# Patient Record
Sex: MALE | Race: WHITE | NOT HISPANIC OR LATINO
[De-identification: names, ages, dates, MRNs, and addresses within clinical notes are randomized per-mention and may not be internally consistent; named-entity substitution may affect disease eponyms.]

---

## 2019-07-09 ENCOUNTER — APPOINTMENT (OUTPATIENT)
Dept: CARDIOLOGY | Facility: CLINIC | Age: 34
End: 2019-07-09
Payer: COMMERCIAL

## 2019-07-09 PROCEDURE — 93000 ELECTROCARDIOGRAM COMPLETE: CPT

## 2019-07-09 PROCEDURE — 99204 OFFICE O/P NEW MOD 45 MIN: CPT

## 2019-08-12 ENCOUNTER — APPOINTMENT (OUTPATIENT)
Dept: CARDIOLOGY | Facility: CLINIC | Age: 34
End: 2019-08-12
Payer: COMMERCIAL

## 2019-08-12 PROCEDURE — 93306 TTE W/DOPPLER COMPLETE: CPT

## 2019-08-14 ENCOUNTER — APPOINTMENT (OUTPATIENT)
Dept: CARDIOLOGY | Facility: CLINIC | Age: 34
End: 2019-08-14
Payer: COMMERCIAL

## 2019-08-14 PROCEDURE — 99213 OFFICE O/P EST LOW 20 MIN: CPT

## 2020-07-29 ENCOUNTER — RECORD ABSTRACTING (OUTPATIENT)
Age: 35
End: 2020-07-29

## 2020-07-29 DIAGNOSIS — Z78.9 OTHER SPECIFIED HEALTH STATUS: ICD-10-CM

## 2020-08-12 ENCOUNTER — APPOINTMENT (OUTPATIENT)
Dept: CARDIOLOGY | Facility: CLINIC | Age: 35
End: 2020-08-12
Payer: COMMERCIAL

## 2020-08-12 VITALS
WEIGHT: 222 LBS | BODY MASS INDEX: 27.03 KG/M2 | TEMPERATURE: 98 F | SYSTOLIC BLOOD PRESSURE: 122 MMHG | HEART RATE: 93 BPM | HEIGHT: 76 IN | DIASTOLIC BLOOD PRESSURE: 80 MMHG

## 2020-08-12 DIAGNOSIS — Z00.00 ENCOUNTER FOR GENERAL ADULT MEDICAL EXAMINATION W/OUT ABNORMAL FINDINGS: ICD-10-CM

## 2020-08-12 DIAGNOSIS — Q24.9 CONGENITAL MALFORMATION OF HEART, UNSPECIFIED: ICD-10-CM

## 2020-08-12 PROCEDURE — 99213 OFFICE O/P EST LOW 20 MIN: CPT

## 2020-08-12 PROCEDURE — 93000 ELECTROCARDIOGRAM COMPLETE: CPT

## 2020-08-18 ENCOUNTER — APPOINTMENT (OUTPATIENT)
Dept: CARDIOLOGY | Facility: CLINIC | Age: 35
End: 2020-08-18
Payer: COMMERCIAL

## 2020-08-18 PROCEDURE — 93306 TTE W/DOPPLER COMPLETE: CPT

## 2020-10-09 ENCOUNTER — APPOINTMENT (OUTPATIENT)
Dept: GASTROENTEROLOGY | Facility: CLINIC | Age: 35
End: 2020-10-09

## 2021-02-02 ENCOUNTER — NON-APPOINTMENT (OUTPATIENT)
Age: 36
End: 2021-02-02

## 2021-02-16 ENCOUNTER — APPOINTMENT (OUTPATIENT)
Dept: CARDIOLOGY | Facility: CLINIC | Age: 36
End: 2021-02-16

## 2021-09-25 ENCOUNTER — TRANSCRIPTION ENCOUNTER (OUTPATIENT)
Age: 36
End: 2021-09-25

## 2021-10-09 ENCOUNTER — TRANSCRIPTION ENCOUNTER (OUTPATIENT)
Age: 36
End: 2021-10-09

## 2023-01-05 ENCOUNTER — EMERGENCY (EMERGENCY)
Facility: HOSPITAL | Age: 38
LOS: 0 days | Discharge: HOME | End: 2023-01-05
Attending: EMERGENCY MEDICINE | Admitting: EMERGENCY MEDICINE
Payer: COMMERCIAL

## 2023-01-05 VITALS
TEMPERATURE: 97 F | OXYGEN SATURATION: 99 % | SYSTOLIC BLOOD PRESSURE: 130 MMHG | WEIGHT: 220.02 LBS | RESPIRATION RATE: 16 BRPM | DIASTOLIC BLOOD PRESSURE: 64 MMHG | HEART RATE: 78 BPM

## 2023-01-05 DIAGNOSIS — M79.18 MYALGIA, OTHER SITE: ICD-10-CM

## 2023-01-05 DIAGNOSIS — Z20.822 CONTACT WITH AND (SUSPECTED) EXPOSURE TO COVID-19: ICD-10-CM

## 2023-01-05 DIAGNOSIS — R11.0 NAUSEA: ICD-10-CM

## 2023-01-05 DIAGNOSIS — B34.9 VIRAL INFECTION, UNSPECIFIED: ICD-10-CM

## 2023-01-05 DIAGNOSIS — Z88.0 ALLERGY STATUS TO PENICILLIN: ICD-10-CM

## 2023-01-05 DIAGNOSIS — R51.9 HEADACHE, UNSPECIFIED: ICD-10-CM

## 2023-01-05 DIAGNOSIS — R20.2 PARESTHESIA OF SKIN: ICD-10-CM

## 2023-01-05 LAB
FLUAV AG NPH QL: SIGNIFICANT CHANGE UP
FLUBV AG NPH QL: SIGNIFICANT CHANGE UP
RSV RNA NPH QL NAA+NON-PROBE: SIGNIFICANT CHANGE UP
SARS-COV-2 RNA SPEC QL NAA+PROBE: SIGNIFICANT CHANGE UP

## 2023-01-05 PROCEDURE — 99284 EMERGENCY DEPT VISIT MOD MDM: CPT

## 2023-01-05 NOTE — ED PROVIDER NOTE - CARE PLAN
1 Principal Discharge DX:	Viral syndrome  Secondary Diagnosis:	Diarrhea  Secondary Diagnosis:	Paresthesias

## 2023-01-05 NOTE — ED ADULT TRIAGE NOTE - TEMPERATURE IN FAHRENHEIT (DEGREES F)
PATIENT NEEDS REFILL ON PROTONIX AND ESCITALOPRAM SENT TO [facility]    ALSO WANTED TO LET DR TAYLOR KNOW PATIENT IS DOING WELL ON THE NEW MEDICATION, ESCITALOPRAM.   97

## 2023-01-05 NOTE — ED PROVIDER NOTE - PHYSICAL EXAMINATION
CONST: Well appearing in NAD  EYES: PERRL, EOMI, Sclera and conjunctiva clear.   ENT: No nasal discharge. Oropharynx normal appearing, no erythema or exudates. Uvula midline.  CARD: Normal S1 S2; Normal rate and rhythm  RESP: Equal BS B/L, No wheezes, rhonchi or rales. No distress  GI: Soft, non-tender, non-distended.  SKIN: Warm, dry, no acute rashes. Good turgor  NEURO: A&Ox3, No focal deficits. Strength 5/5 with no sensory deficits. Steady gait

## 2023-01-05 NOTE — ED PROVIDER NOTE - OBJECTIVE STATEMENT
38yo male with no significant PMH, PSH significant for atrial septal defect repair, presents with recent illness, symptoms consistent of gradual onset nonexertional headache, nausea, myalgias, today experience paresthesias to arms legs and genitals after taking Excedrin, now resolved. Pt also notes diarrhea for 4 days and son sick with similar symptoms. Pt denies any aggravating or relieving factors. Initially prompted to come to ED for paresthesias after taking Excedrin but they have self resolved. Pt denies extremity weakness, loss of power.

## 2023-01-05 NOTE — ED PROVIDER NOTE - PATIENT PORTAL LINK FT
You can access the FollowMyHealth Patient Portal offered by [facility] by registering at the following website: [email or web address]. By joining [employer]’s FollowMyHealth portal, you will also be able to view your health information using other applications (apps) compatible with our system.

## 2023-01-05 NOTE — ED PROVIDER NOTE - NS ED ROS FT
CONST: No fever, chills or bodyaches  EYES: No pain, redness, drainage or visual changes.  ENT: No ear pain or discharge, nasal discharge or congestion. No sore throat  CARD: No chest pain, palpitations  RESP: No SOB, cough  GI: (+) diarrhea, no abdominal pain  MS: No joint pain, back pain or extremity pain/injury  SKIN: No rashes  NEURO: (+) paresthesias

## 2023-01-05 NOTE — ED ADULT TRIAGE NOTE - CHIEF COMPLAINT QUOTE
pt reports he had the flu last week and has been taking imodium and Excedrin, now he feels a "tingling" sensation to his genitals

## 2023-01-05 NOTE — ED PROVIDER NOTE - ATTENDING APP SHARED VISIT CONTRIBUTION OF CARE
37-year-old male denies significant PMH, PSH significant for atrial septal defect repair, non-smoker, denies daily EtOH use, presents with recent illness, symptoms consistent of gradual onset nonexertional headache, nausea, myalgias, today experience paresthesias to arms legs and genitals, now resolved,    Patient reports sick contacts at home (son). 37-year-old male denies significant PMH, PSH significant for atrial septal defect repair, non-smoker, denies daily EtOH use, presents with recent illness, symptoms consistent of gradual onset nonexertional headache, nausea, myalgias, today experience paresthesias to arms legs and genitals, now resolved, denies ear pain, sore throat, stuffy / runny nose, neck pain, cough, chest / abdominal pain, v/d, anorexia, respiratory sx, change in bowel habits or urinary sx, rash or other associated complaints at present. Patient reports sick contacts at home (son). No old chart available for review. I have reviewed and agree with the initial nursing note, except as documented in my note.    VSS, awake, alert, non-toxic appearing, ears clear, oropharynx clear, mmm, no JVD or bruit, no skin rash or lesions, chest CTAB, non-labored breathing, no w/r/r, +S1/S2, RRR, no m/r/g, abdomen soft, NT, ND, +BS, no peripheral edema or deformities, CN II- XII intact, alert, clear speech and steady gait.

## 2023-01-06 ENCOUNTER — TRANSCRIPTION ENCOUNTER (OUTPATIENT)
Age: 38
End: 2023-01-06

## 2023-09-25 ENCOUNTER — APPOINTMENT (OUTPATIENT)
Dept: CARDIOLOGY | Facility: CLINIC | Age: 38
End: 2023-09-25
Payer: COMMERCIAL

## 2023-09-25 VITALS
HEIGHT: 76 IN | SYSTOLIC BLOOD PRESSURE: 110 MMHG | DIASTOLIC BLOOD PRESSURE: 64 MMHG | OXYGEN SATURATION: 97 % | WEIGHT: 221 LBS | BODY MASS INDEX: 26.91 KG/M2 | HEART RATE: 85 BPM

## 2023-09-25 DIAGNOSIS — Z78.9 OTHER SPECIFIED HEALTH STATUS: ICD-10-CM

## 2023-09-25 DIAGNOSIS — R94.31 ABNORMAL ELECTROCARDIOGRAM [ECG] [EKG]: ICD-10-CM

## 2023-09-25 DIAGNOSIS — Q21.10 ATRIAL SEPTAL DEFECT, UNSPECIFIED: ICD-10-CM

## 2023-09-25 PROCEDURE — 93000 ELECTROCARDIOGRAM COMPLETE: CPT

## 2023-09-25 PROCEDURE — 99204 OFFICE O/P NEW MOD 45 MIN: CPT | Mod: 25

## 2023-09-28 ENCOUNTER — NON-APPOINTMENT (OUTPATIENT)
Age: 38
End: 2023-09-28

## 2023-10-09 ENCOUNTER — APPOINTMENT (OUTPATIENT)
Dept: CARDIOLOGY | Facility: CLINIC | Age: 38
End: 2023-10-09

## 2024-03-02 ENCOUNTER — EMERGENCY (EMERGENCY)
Facility: HOSPITAL | Age: 39
LOS: 0 days | Discharge: HOME | End: 2024-03-02
Attending: STUDENT IN AN ORGANIZED HEALTH CARE EDUCATION/TRAINING PROGRAM
Payer: COMMERCIAL

## 2024-03-02 VITALS
WEIGHT: 220.02 LBS | SYSTOLIC BLOOD PRESSURE: 117 MMHG | DIASTOLIC BLOOD PRESSURE: 92 MMHG | HEART RATE: 84 BPM | TEMPERATURE: 98 F | OXYGEN SATURATION: 98 % | RESPIRATION RATE: 14 BRPM

## 2024-03-02 DIAGNOSIS — S46.912A STRAIN OF UNSPECIFIED MUSCLE, FASCIA AND TENDON AT SHOULDER AND UPPER ARM LEVEL, LEFT ARM, INITIAL ENCOUNTER: ICD-10-CM

## 2024-03-02 DIAGNOSIS — Q21.10 ATRIAL SEPTAL DEFECT, UNSPECIFIED: ICD-10-CM
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
## 2024-03-02 DIAGNOSIS — Z88.0 ALLERGY STATUS TO PENICILLIN: ICD-10-CM

## 2024-03-02 DIAGNOSIS — M62.838 OTHER MUSCLE SPASM: ICD-10-CM

## 2024-03-02 DIAGNOSIS — M54.2 CERVICALGIA: ICD-10-CM

## 2024-03-02 DIAGNOSIS — M25.512 PAIN IN LEFT SHOULDER: ICD-10-CM

## 2024-03-02 DIAGNOSIS — M54.50 LOW BACK PAIN, UNSPECIFIED: ICD-10-CM

## 2024-03-02 PROCEDURE — 99283 EMERGENCY DEPT VISIT LOW MDM: CPT

## 2024-03-02 PROCEDURE — 99284 EMERGENCY DEPT VISIT MOD MDM: CPT

## 2024-03-02 RX ORDER — IBUPROFEN 200 MG
600 TABLET ORAL ONCE
Refills: 0 | Status: COMPLETED | OUTPATIENT
Start: 2024-03-02 | End: 2024-03-02

## 2024-03-02 RX ADMIN — Medication 600 MILLIGRAM(S): at 12:56
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
## 2024-03-02 NOTE — ED PROVIDER NOTE - PATIENT PORTAL LINK FT
You can access the FollowMyHealth Patient Portal offered by [facility] by registering at the following website: [email or web address]. By joining [employer]’s FollowMyHealth portal, you will also be able to view your health information using other applications (apps) compatible with our system.
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
## 2024-05-13 ENCOUNTER — APPOINTMENT (OUTPATIENT)
Dept: UROLOGY | Facility: CLINIC | Age: 39
End: 2024-05-13
Payer: COMMERCIAL

## 2024-05-13 VITALS
OXYGEN SATURATION: 97 % | DIASTOLIC BLOOD PRESSURE: 85 MMHG | HEIGHT: 76 IN | SYSTOLIC BLOOD PRESSURE: 139 MMHG | HEART RATE: 96 BPM | WEIGHT: 230 LBS | BODY MASS INDEX: 28.01 KG/M2

## 2024-05-13 DIAGNOSIS — N28.89 OTHER SPECIFIED DISORDERS OF KIDNEY AND URETER: ICD-10-CM

## 2024-05-13 DIAGNOSIS — R93.49 ABNORMAL RADIOLOGIC FINDINGS ON DIAGNOSITIC IMAGING OF OTHER URINARY ORGANS: ICD-10-CM

## 2024-05-13 PROCEDURE — 99205 OFFICE O/P NEW HI 60 MIN: CPT | Mod: 25

## 2024-05-13 PROCEDURE — 81003 URINALYSIS AUTO W/O SCOPE: CPT | Mod: QW

## 2024-05-13 NOTE — PHYSICAL EXAM
[General Appearance - Well Developed] : well developed [General Appearance - Well Nourished] : well nourished [Normal Appearance] : normal appearance [Well Groomed] : well groomed [General Appearance - In No Acute Distress] : no acute distress [Edema] : no peripheral edema [Respiration, Rhythm And Depth] : normal respiratory rhythm and effort [Exaggerated Use Of Accessory Muscles For Inspiration] : no accessory muscle use [Abdomen Soft] : soft [Normal Station and Gait] : the gait and station were normal for the patient's age [] : no rash [No Focal Deficits] : no focal deficits [Oriented To Time, Place, And Person] : oriented to person, place, and time [Affect] : the affect was normal [Mood] : the mood was normal [Not Anxious] : not anxious

## 2024-05-14 PROBLEM — R93.49 ABNORMAL FINDINGS ON DIAGNOSTIC IMAGING OF URINARY ORGANS: Status: ACTIVE | Noted: 2024-05-14

## 2024-05-14 LAB
BILIRUB UR QL STRIP: NORMAL
COLLECTION METHOD: NORMAL
GLUCOSE UR-MCNC: NORMAL
HCG UR QL: 0.2 EU/DL
HGB UR QL STRIP.AUTO: NORMAL
KETONES UR-MCNC: NORMAL
LEUKOCYTE ESTERASE UR QL STRIP: NORMAL
NITRITE UR QL STRIP: NORMAL
PH UR STRIP: 6.5
PROT UR STRIP-MCNC: NORMAL
SP GR UR STRIP: 1.02

## 2024-05-14 NOTE — LETTER BODY
[Dear  ___] : Dear  [unfilled], [Consult Letter:] : I had the pleasure of evaluating your patient, [unfilled]. [Please see my note below.] : Please see my note below. [Sincerely,] : Sincerely, [FreeTextEntry3] : Tim Little MD, FACS
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
## 2024-05-14 NOTE — ASSESSMENT
[FreeTextEntry1] : Edwin is a 38-year-old male, with a history of atrial septal defect repair in the past, who presents for evaluation of incidentally found renal mass on noncontrast lumbar spine MRI.  Etiology of renal cell carcinoma discussed in detail   We reviewed the possible underlying histology of solid enhancing renal masses, with the majority being malignant (~80%) whereas ~20% are benign (e.g. oncocytoma). We discussed the role/possibility of percutaneous biopsy, with the associated risks, benefits, complications, and accuracy issues (e.g. risk of false negative results). The heterogeneous natural history/biology of renal cell carcinoma was discussed, including the fact that while many renal cancers are indolent, others behave aggressively.   The options were reviewed including active surveillance (AS), surgical extirpation and ablation. The risks of tumor growth and metastasis on active surveillance were reviewed, including the fact that metastatic progression on AS could mean missing the opportunity for curative treatment. The average growth rate of ~2-3 mm/year and metastasis rates of ~2-3% on AS over 5 year interval for small renal masses <4 cm was discussed.    Plan: -BMP -CT abdomen/pelvis with and without IV contrast, renal mass protocol.  All aspects of contrast dye reviewed with regards to anaphylaxis. -Follow-up few weeks for review

## 2024-05-14 NOTE — ADDENDUM
[FreeTextEntry1] : Documented by Jose Barrera acting as a scribe for Dr. Tim Little   All medical record entries made by the Scribe were at my,  direction and personally dictated by me.  I have reviewed the chart and agree that the record accurately reflects my personal performance of the history, physical exam, procedure and imaging.

## 2024-06-20 ENCOUNTER — NON-APPOINTMENT (OUTPATIENT)
Age: 39
End: 2024-06-20

## 2024-06-20 ENCOUNTER — RESULT REVIEW (OUTPATIENT)
Age: 39
End: 2024-06-20

## 2024-06-20 ENCOUNTER — OUTPATIENT (OUTPATIENT)
Dept: OUTPATIENT SERVICES | Facility: HOSPITAL | Age: 39
LOS: 1 days | End: 2024-06-20
Payer: COMMERCIAL

## 2024-06-20 DIAGNOSIS — N28.89 OTHER SPECIFIED DISORDERS OF KIDNEY AND URETER: ICD-10-CM

## 2024-06-20 DIAGNOSIS — Z00.8 ENCOUNTER FOR OTHER GENERAL EXAMINATION: ICD-10-CM

## 2024-06-20 PROCEDURE — 74178 CT ABD&PLV WO CNTR FLWD CNTR: CPT

## 2024-06-20 PROCEDURE — 74178 CT ABD&PLV WO CNTR FLWD CNTR: CPT | Mod: 26

## 2024-06-21 DIAGNOSIS — N28.89 OTHER SPECIFIED DISORDERS OF KIDNEY AND URETER: ICD-10-CM

## 2024-07-25 ENCOUNTER — APPOINTMENT (OUTPATIENT)
Dept: UROLOGY | Facility: CLINIC | Age: 39
End: 2024-07-25
Payer: COMMERCIAL

## 2024-07-25 VITALS
WEIGHT: 230 LBS | HEART RATE: 97 BPM | TEMPERATURE: 97.5 F | HEIGHT: 76 IN | DIASTOLIC BLOOD PRESSURE: 95 MMHG | SYSTOLIC BLOOD PRESSURE: 138 MMHG | BODY MASS INDEX: 28.01 KG/M2

## 2024-07-25 DIAGNOSIS — N28.1 CYST OF KIDNEY, ACQUIRED: ICD-10-CM

## 2024-07-25 PROCEDURE — 99214 OFFICE O/P EST MOD 30 MIN: CPT

## 2024-07-31 PROBLEM — N28.1 SIMPLE CYST OF KIDNEY: Status: ACTIVE | Noted: 2024-07-31
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
## 2024-10-09 ENCOUNTER — APPOINTMENT (OUTPATIENT)
Dept: CARDIOLOGY | Facility: CLINIC | Age: 39
End: 2024-10-09
Payer: COMMERCIAL

## 2024-10-09 VITALS
HEART RATE: 106 BPM | WEIGHT: 239 LBS | BODY MASS INDEX: 29.1 KG/M2 | HEIGHT: 76 IN | SYSTOLIC BLOOD PRESSURE: 134 MMHG | DIASTOLIC BLOOD PRESSURE: 82 MMHG

## 2024-10-09 DIAGNOSIS — R94.31 ABNORMAL ELECTROCARDIOGRAM [ECG] [EKG]: ICD-10-CM

## 2024-10-09 DIAGNOSIS — Q21.10 ATRIAL SEPTAL DEFECT, UNSPECIFIED: ICD-10-CM

## 2024-10-09 DIAGNOSIS — Q24.9 CONGENITAL MALFORMATION OF HEART, UNSPECIFIED: ICD-10-CM

## 2024-10-09 PROCEDURE — 99214 OFFICE O/P EST MOD 30 MIN: CPT | Mod: 25

## 2024-10-09 PROCEDURE — 93000 ELECTROCARDIOGRAM COMPLETE: CPT

## 2024-10-28 ENCOUNTER — APPOINTMENT (OUTPATIENT)
Dept: CARDIOLOGY | Facility: CLINIC | Age: 39
End: 2024-10-28
Payer: COMMERCIAL

## 2024-10-28 PROCEDURE — 93306 TTE W/DOPPLER COMPLETE: CPT
